# Patient Record
Sex: MALE | Race: WHITE | NOT HISPANIC OR LATINO | URBAN - METROPOLITAN AREA
[De-identification: names, ages, dates, MRNs, and addresses within clinical notes are randomized per-mention and may not be internally consistent; named-entity substitution may affect disease eponyms.]

---

## 2021-01-16 ENCOUNTER — WEB ENCOUNTER (OUTPATIENT)
Dept: URBAN - METROPOLITAN AREA CLINIC 35 | Facility: CLINIC | Age: 41
End: 2021-01-16

## 2021-01-19 ENCOUNTER — DASHBOARD ENCOUNTERS (OUTPATIENT)
Age: 41
End: 2021-01-19

## 2021-01-19 ENCOUNTER — OFFICE VISIT (OUTPATIENT)
Dept: URBAN - METROPOLITAN AREA CLINIC 33 | Facility: CLINIC | Age: 41
End: 2021-01-19

## 2021-01-19 VITALS
OXYGEN SATURATION: 98 % | HEART RATE: 72 BPM | BODY MASS INDEX: 25.91 KG/M2 | WEIGHT: 181 LBS | DIASTOLIC BLOOD PRESSURE: 85 MMHG | SYSTOLIC BLOOD PRESSURE: 125 MMHG | HEIGHT: 70 IN

## 2021-01-19 RX ORDER — SODIUM SULFATE, POTASSIUM SULFATE, MAGNESIUM SULFATE 17.5; 3.13; 1.6 G/ML; G/ML; G/ML
AS DIRECTED SOLUTION, CONCENTRATE ORAL
Qty: 1 | Refills: 0 | OUTPATIENT
Start: 2021-01-19

## 2021-01-19 NOTE — HPI-MIGRATED HPI
;     Rectal Bleeding : Patient is a 40-year-old  male who presents today for a colorectal cancer screening. Patient admits this is first colonoscopy. Admits a family history of colon cancer with father and paternal grandmother. Patient denies a family history of gastric/esophageal cancer/polyps. Patient currently admits 2 bowel movements a day. Stools are typically formed. Patient denies melena, blood, or mucus in stool, heartburn, nausea, vomiting, diarrhea, or constipation.   Admits episodes of rectal bleeding in the past 1 year. He had episodes of rectal bleeding on 11/22-11/24 and 12/21-12/23/2020. His first episode was 6-8 months before the second time in November. The blood is BRB and is present on tissue and within the toilet bowl, enough to stain the water red. Patient has not tried anything. Patient denies any lightheadeness, dizziness, fatigue.  Patient has 2 bowel movements a day. Stools are typically formed. Patient denies associated symptoms of abdominal pain, melena, mucus, or blood in stool, rectal bleeding, pruritus ani.  ;

## 2021-03-26 ENCOUNTER — OFFICE VISIT (OUTPATIENT)
Dept: URBAN - METROPOLITAN AREA SURGERY CENTER 8 | Facility: SURGERY CENTER | Age: 41
End: 2021-03-26

## 2021-04-06 ENCOUNTER — OFFICE VISIT (OUTPATIENT)
Dept: URBAN - METROPOLITAN AREA CLINIC 33 | Facility: CLINIC | Age: 41
End: 2021-04-06

## 2021-04-09 ENCOUNTER — OFFICE VISIT (OUTPATIENT)
Dept: URBAN - METROPOLITAN AREA SURGERY CENTER 8 | Facility: SURGERY CENTER | Age: 41
End: 2021-04-09

## 2021-04-26 ENCOUNTER — OFFICE VISIT (OUTPATIENT)
Dept: URBAN - METROPOLITAN AREA CLINIC 35 | Facility: CLINIC | Age: 41
End: 2021-04-26

## 2021-04-27 ENCOUNTER — TELEPHONE ENCOUNTER (OUTPATIENT)
Dept: URBAN - METROPOLITAN AREA CLINIC 35 | Facility: CLINIC | Age: 41
End: 2021-04-27

## 2021-04-27 ENCOUNTER — OFFICE VISIT (OUTPATIENT)
Dept: URBAN - METROPOLITAN AREA CLINIC 33 | Facility: CLINIC | Age: 41
End: 2021-04-27

## 2021-04-27 RX ORDER — SODIUM SULFATE, POTASSIUM SULFATE, MAGNESIUM SULFATE 17.5; 3.13; 1.6 G/ML; G/ML; G/ML
AS DIRECTED SOLUTION, CONCENTRATE ORAL
Qty: 1 | Refills: 0 | Status: ACTIVE | COMMUNITY
Start: 2021-01-19

## 2021-04-27 NOTE — HPI-MIGRATED HPI
;     Rectal Bleeding :        Patient presents today for follow up to his colonoscopy and rectal bleeding.  His colonoscopy was completed on 04/09/2021 with Dr Navarro results noted below.  He admits/denies any complications after her procedure. He admits/denies any episodes of rectal bleeding since his last visit. Currently has __ bowel movements per day.  Stools are __with/without any melena, blood or mucus.        Last visit (01/19/2021) Patient is a 40-year-old  male who presents today for a colorectal cancer screening. Patient admits this is first colonoscopy. Admits a family history of colon cancer with father and paternal grandmother. Patient currently admits 2 bowel movements a day. Stools are typically formed. Patient denies melena, blood, or mucus in stool, heartburn, nausea, vomiting, diarrhea, or constipation.   Admits episodes of rectal bleeding in the past 1 year. He had episodes of rectal bleeding on 11/22-11/24 and 12/21-12/23/2020. His first episode was 6-8 months before the second time in November. The blood is BRB and is present on tissue and within the toilet bowl, enough to stain the water red. Patient has not tried anything. Patient denies any lightheaded, dizziness, fatigue.  Patient has 2 bowel movements a day. Stools are typically formed. Patient denies associated symptoms of abdominal pain, melena, mucus, or blood in stool, rectal bleeding, pruritus ani.;